# Patient Record
Sex: FEMALE | Race: WHITE | NOT HISPANIC OR LATINO | Employment: OTHER | URBAN - METROPOLITAN AREA
[De-identification: names, ages, dates, MRNs, and addresses within clinical notes are randomized per-mention and may not be internally consistent; named-entity substitution may affect disease eponyms.]

---

## 2024-07-24 ENCOUNTER — APPOINTMENT (OUTPATIENT)
Dept: RADIOLOGY | Facility: HOSPITAL | Age: 74
DRG: 637 | End: 2024-07-24
Payer: MEDICARE

## 2024-07-24 ENCOUNTER — HOSPITAL ENCOUNTER (INPATIENT)
Facility: HOSPITAL | Age: 74
LOS: 1 days | Discharge: NURSING, REHAB OR LONG-TERM CARE | DRG: 637 | End: 2024-07-26
Attending: STUDENT IN AN ORGANIZED HEALTH CARE EDUCATION/TRAINING PROGRAM | Admitting: STUDENT IN AN ORGANIZED HEALTH CARE EDUCATION/TRAINING PROGRAM
Payer: MEDICARE

## 2024-07-24 ENCOUNTER — APPOINTMENT (OUTPATIENT)
Dept: CARDIOLOGY | Facility: HOSPITAL | Age: 74
DRG: 637 | End: 2024-07-24
Payer: MEDICARE

## 2024-07-24 DIAGNOSIS — R65.10 SIRS (SYSTEMIC INFLAMMATORY RESPONSE SYNDROME) (MULTI): ICD-10-CM

## 2024-07-24 DIAGNOSIS — N17.9 AKI (ACUTE KIDNEY INJURY) (CMS-HCC): ICD-10-CM

## 2024-07-24 DIAGNOSIS — Z96.651 STATUS POST TOTAL RIGHT KNEE REPLACEMENT: ICD-10-CM

## 2024-07-24 DIAGNOSIS — R73.9 HYPERGLYCEMIA: Primary | ICD-10-CM

## 2024-07-24 LAB
ANION GAP SERPL CALC-SCNC: 23 MMOL/L (ref 10–20)
BASOPHILS # BLD AUTO: 0.09 X10*3/UL (ref 0–0.1)
BASOPHILS NFR BLD AUTO: 0.6 %
BUN SERPL-MCNC: 36 MG/DL (ref 6–23)
CALCIUM SERPL-MCNC: 10.7 MG/DL (ref 8.6–10.3)
CHLORIDE SERPL-SCNC: 102 MMOL/L (ref 98–107)
CO2 SERPL-SCNC: 14 MMOL/L (ref 21–32)
CREAT SERPL-MCNC: 1.83 MG/DL (ref 0.5–1.05)
EGFRCR SERPLBLD CKD-EPI 2021: 29 ML/MIN/1.73M*2
EOSINOPHIL # BLD AUTO: 0.16 X10*3/UL (ref 0–0.4)
EOSINOPHIL NFR BLD AUTO: 1 %
ERYTHROCYTE [DISTWIDTH] IN BLOOD BY AUTOMATED COUNT: 17 % (ref 11.5–14.5)
GLUCOSE SERPL-MCNC: 368 MG/DL (ref 74–99)
HCT VFR BLD AUTO: 30.8 % (ref 36–46)
HGB BLD-MCNC: 9.6 G/DL (ref 12–16)
IMM GRANULOCYTES # BLD AUTO: 0.19 X10*3/UL (ref 0–0.5)
IMM GRANULOCYTES NFR BLD AUTO: 1.2 % (ref 0–0.9)
LYMPHOCYTES # BLD AUTO: 2.12 X10*3/UL (ref 0.8–3)
LYMPHOCYTES NFR BLD AUTO: 13.7 %
MCH RBC QN AUTO: 27.4 PG (ref 26–34)
MCHC RBC AUTO-ENTMCNC: 31.2 G/DL (ref 32–36)
MCV RBC AUTO: 88 FL (ref 80–100)
MONOCYTES # BLD AUTO: 1.21 X10*3/UL (ref 0.05–0.8)
MONOCYTES NFR BLD AUTO: 7.8 %
NEUTROPHILS # BLD AUTO: 11.68 X10*3/UL (ref 1.6–5.5)
NEUTROPHILS NFR BLD AUTO: 75.7 %
NRBC BLD-RTO: 0 /100 WBCS (ref 0–0)
PLATELET # BLD AUTO: 304 X10*3/UL (ref 150–450)
POTASSIUM SERPL-SCNC: 4.1 MMOL/L (ref 3.5–5.3)
RBC # BLD AUTO: 3.51 X10*6/UL (ref 4–5.2)
SODIUM SERPL-SCNC: 135 MMOL/L (ref 136–145)
WBC # BLD AUTO: 15.5 X10*3/UL (ref 4.4–11.3)

## 2024-07-24 PROCEDURE — 82010 KETONE BODYS QUAN: CPT | Performed by: STUDENT IN AN ORGANIZED HEALTH CARE EDUCATION/TRAINING PROGRAM

## 2024-07-24 PROCEDURE — 71045 X-RAY EXAM CHEST 1 VIEW: CPT

## 2024-07-24 PROCEDURE — 99285 EMERGENCY DEPT VISIT HI MDM: CPT | Mod: 25

## 2024-07-24 PROCEDURE — 85025 COMPLETE CBC W/AUTO DIFF WBC: CPT | Performed by: STUDENT IN AN ORGANIZED HEALTH CARE EDUCATION/TRAINING PROGRAM

## 2024-07-24 PROCEDURE — 80048 BASIC METABOLIC PNL TOTAL CA: CPT | Performed by: STUDENT IN AN ORGANIZED HEALTH CARE EDUCATION/TRAINING PROGRAM

## 2024-07-24 PROCEDURE — 96374 THER/PROPH/DIAG INJ IV PUSH: CPT

## 2024-07-24 PROCEDURE — 71045 X-RAY EXAM CHEST 1 VIEW: CPT | Mod: FOREIGN READ | Performed by: RADIOLOGY

## 2024-07-24 PROCEDURE — 93005 ELECTROCARDIOGRAM TRACING: CPT

## 2024-07-24 PROCEDURE — 96361 HYDRATE IV INFUSION ADD-ON: CPT

## 2024-07-24 PROCEDURE — 36415 COLL VENOUS BLD VENIPUNCTURE: CPT | Performed by: STUDENT IN AN ORGANIZED HEALTH CARE EDUCATION/TRAINING PROGRAM

## 2024-07-24 PROCEDURE — 2500000004 HC RX 250 GENERAL PHARMACY W/ HCPCS (ALT 636 FOR OP/ED): Performed by: STUDENT IN AN ORGANIZED HEALTH CARE EDUCATION/TRAINING PROGRAM

## 2024-07-24 RX ORDER — MORPHINE SULFATE 4 MG/ML
4 INJECTION INTRAVENOUS ONCE
Status: COMPLETED | OUTPATIENT
Start: 2024-07-24 | End: 2024-07-24

## 2024-07-24 ASSESSMENT — PAIN - FUNCTIONAL ASSESSMENT: PAIN_FUNCTIONAL_ASSESSMENT: 0-10

## 2024-07-24 ASSESSMENT — LIFESTYLE VARIABLES
EVER HAD A DRINK FIRST THING IN THE MORNING TO STEADY YOUR NERVES TO GET RID OF A HANGOVER: NO
EVER FELT BAD OR GUILTY ABOUT YOUR DRINKING: NO
HAVE PEOPLE ANNOYED YOU BY CRITICIZING YOUR DRINKING: NO
TOTAL SCORE: 0
HAVE YOU EVER FELT YOU SHOULD CUT DOWN ON YOUR DRINKING: NO

## 2024-07-24 ASSESSMENT — COLUMBIA-SUICIDE SEVERITY RATING SCALE - C-SSRS
1. IN THE PAST MONTH, HAVE YOU WISHED YOU WERE DEAD OR WISHED YOU COULD GO TO SLEEP AND NOT WAKE UP?: NO
2. HAVE YOU ACTUALLY HAD ANY THOUGHTS OF KILLING YOURSELF?: NO
6. HAVE YOU EVER DONE ANYTHING, STARTED TO DO ANYTHING, OR PREPARED TO DO ANYTHING TO END YOUR LIFE?: NO

## 2024-07-24 ASSESSMENT — PAIN SCALES - GENERAL: PAINLEVEL_OUTOF10: 3

## 2024-07-25 PROBLEM — Z96.651 STATUS POST TOTAL RIGHT KNEE REPLACEMENT: Status: ACTIVE | Noted: 2024-01-31

## 2024-07-25 PROBLEM — G47.33 OSA (OBSTRUCTIVE SLEEP APNEA): Status: RESOLVED | Noted: 2020-07-07 | Resolved: 2024-07-25

## 2024-07-25 PROBLEM — M17.11 PRIMARY OSTEOARTHRITIS OF RIGHT KNEE: Status: RESOLVED | Noted: 2022-10-27 | Resolved: 2024-07-25

## 2024-07-25 PROBLEM — R26.2 CANNOT WALK: Status: ACTIVE | Noted: 2024-07-25

## 2024-07-25 PROBLEM — E66.9 OBESITY, CLASS I, BMI 30-34.9: Status: ACTIVE | Noted: 2022-10-27

## 2024-07-25 PROBLEM — D64.9 NORMOCYTIC ANEMIA: Status: ACTIVE | Noted: 2024-07-25

## 2024-07-25 PROBLEM — N18.9 CKD (CHRONIC KIDNEY DISEASE): Status: ACTIVE | Noted: 2024-07-25

## 2024-07-25 PROBLEM — R65.10 SIRS (SYSTEMIC INFLAMMATORY RESPONSE SYNDROME) (MULTI): Status: ACTIVE | Noted: 2024-07-25

## 2024-07-25 PROBLEM — E53.8 VITAMIN B12 DEFICIENCY: Status: RESOLVED | Noted: 2021-04-07 | Resolved: 2024-07-25

## 2024-07-25 PROBLEM — R73.9 HYPERGLYCEMIA: Status: ACTIVE | Noted: 2024-07-25

## 2024-07-25 PROBLEM — R00.0 TACHYCARDIA: Status: ACTIVE | Noted: 2024-07-25

## 2024-07-25 PROBLEM — E87.29 HIGH ANION GAP METABOLIC ACIDOSIS: Status: ACTIVE | Noted: 2024-07-25

## 2024-07-25 PROBLEM — D72.829 LEUKOCYTOSIS: Status: ACTIVE | Noted: 2024-07-25

## 2024-07-25 LAB
ANION GAP BLDV CALCULATED.4IONS-SCNC: 14 MMOL/L (ref 10–25)
ANION GAP SERPL CALC-SCNC: 8 MMOL/L (ref 10–20)
APPEARANCE UR: CLEAR
ATRIAL RATE: 109 BPM
B-OH-BUTYR SERPL-SCNC: 1.15 MMOL/L (ref 0.02–0.27)
BASE EXCESS BLDV CALC-SCNC: -4.9 MMOL/L (ref -2–3)
BILIRUB UR STRIP.AUTO-MCNC: NEGATIVE MG/DL
BODY TEMPERATURE: 37 DEGREES CELSIUS
BUN SERPL-MCNC: 34 MG/DL (ref 6–23)
CA-I BLDV-SCNC: 1.46 MMOL/L (ref 1.1–1.33)
CALCIUM SERPL-MCNC: 9.7 MG/DL (ref 8.6–10.3)
CHLORIDE BLDV-SCNC: 103 MMOL/L (ref 98–107)
CHLORIDE SERPL-SCNC: 105 MMOL/L (ref 98–107)
CO2 SERPL-SCNC: 24 MMOL/L (ref 21–32)
COLOR UR: ABNORMAL
CREAT SERPL-MCNC: 1.53 MG/DL (ref 0.5–1.05)
EGFRCR SERPLBLD CKD-EPI 2021: 35 ML/MIN/1.73M*2
ERYTHROCYTE [DISTWIDTH] IN BLOOD BY AUTOMATED COUNT: 16.8 % (ref 11.5–14.5)
GLUCOSE BLD MANUAL STRIP-MCNC: 234 MG/DL (ref 74–99)
GLUCOSE BLD MANUAL STRIP-MCNC: 252 MG/DL (ref 74–99)
GLUCOSE BLD MANUAL STRIP-MCNC: 292 MG/DL (ref 74–99)
GLUCOSE BLD MANUAL STRIP-MCNC: 336 MG/DL (ref 74–99)
GLUCOSE BLD MANUAL STRIP-MCNC: 389 MG/DL (ref 74–99)
GLUCOSE BLDV-MCNC: 422 MG/DL (ref 74–99)
GLUCOSE SERPL-MCNC: 320 MG/DL (ref 74–99)
GLUCOSE UR STRIP.AUTO-MCNC: ABNORMAL MG/DL
HCO3 BLDV-SCNC: 20.6 MMOL/L (ref 22–26)
HCT VFR BLD AUTO: 25.7 % (ref 36–46)
HCT VFR BLD EST: 29 % (ref 36–46)
HGB BLD-MCNC: 8.4 G/DL (ref 12–16)
HGB BLDV-MCNC: 9.7 G/DL (ref 12–16)
HOLD SPECIMEN: NORMAL
HYALINE CASTS #/AREA URNS AUTO: ABNORMAL /LPF
INHALED O2 CONCENTRATION: 21 %
KETONES UR STRIP.AUTO-MCNC: ABNORMAL MG/DL
LACTATE BLDV-SCNC: 3.4 MMOL/L (ref 0.4–2)
LEUKOCYTE ESTERASE UR QL STRIP.AUTO: NEGATIVE
MCH RBC QN AUTO: 27.7 PG (ref 26–34)
MCHC RBC AUTO-ENTMCNC: 32.7 G/DL (ref 32–36)
MCV RBC AUTO: 85 FL (ref 80–100)
MUCOUS THREADS #/AREA URNS AUTO: ABNORMAL /LPF
NITRITE UR QL STRIP.AUTO: NEGATIVE
NRBC BLD-RTO: 0 /100 WBCS (ref 0–0)
OXYHGB MFR BLDV: 42.2 % (ref 45–75)
P AXIS: 48 DEGREES
PCO2 BLDV: 39 MM HG (ref 41–51)
PH BLDV: 7.33 PH (ref 7.33–7.43)
PH UR STRIP.AUTO: 5 [PH]
PLATELET # BLD AUTO: 246 X10*3/UL (ref 150–450)
PO2 BLDV: 28 MM HG (ref 35–45)
POTASSIUM BLDV-SCNC: 4.4 MMOL/L (ref 3.5–5.3)
POTASSIUM SERPL-SCNC: 3.8 MMOL/L (ref 3.5–5.3)
PR INTERVAL: 134 MS
PROT UR STRIP.AUTO-MCNC: NEGATIVE MG/DL
Q ONSET: 251 MS
QRS COUNT: 18 BEATS
QRS DURATION: 117 MS
QT INTERVAL: 336 MS
QTC CALCULATION(BAZETT): 455 MS
QTC FREDERICIA: 411 MS
R AXIS: -51 DEGREES
RBC # BLD AUTO: 3.03 X10*6/UL (ref 4–5.2)
RBC # UR STRIP.AUTO: ABNORMAL /UL
RBC #/AREA URNS AUTO: ABNORMAL /HPF
SAO2 % BLDV: 43 % (ref 45–75)
SODIUM BLDV-SCNC: 133 MMOL/L (ref 136–145)
SODIUM SERPL-SCNC: 133 MMOL/L (ref 136–145)
SP GR UR STRIP.AUTO: 1.02
SQUAMOUS #/AREA URNS AUTO: ABNORMAL /HPF
T AXIS: 110 DEGREES
T OFFSET: 419 MS
UROBILINOGEN UR STRIP.AUTO-MCNC: NORMAL MG/DL
VENTRICULAR RATE: 110 BPM
WBC # BLD AUTO: 10.8 X10*3/UL (ref 4.4–11.3)
WBC #/AREA URNS AUTO: ABNORMAL /HPF

## 2024-07-25 PROCEDURE — 97110 THERAPEUTIC EXERCISES: CPT | Mod: GP | Performed by: PHYSICAL THERAPIST

## 2024-07-25 PROCEDURE — 84132 ASSAY OF SERUM POTASSIUM: CPT | Performed by: STUDENT IN AN ORGANIZED HEALTH CARE EDUCATION/TRAINING PROGRAM

## 2024-07-25 PROCEDURE — 96361 HYDRATE IV INFUSION ADD-ON: CPT

## 2024-07-25 PROCEDURE — G0378 HOSPITAL OBSERVATION PER HR: HCPCS

## 2024-07-25 PROCEDURE — 82947 ASSAY GLUCOSE BLOOD QUANT: CPT

## 2024-07-25 PROCEDURE — 2500000004 HC RX 250 GENERAL PHARMACY W/ HCPCS (ALT 636 FOR OP/ED): Performed by: STUDENT IN AN ORGANIZED HEALTH CARE EDUCATION/TRAINING PROGRAM

## 2024-07-25 PROCEDURE — 99223 1ST HOSP IP/OBS HIGH 75: CPT | Performed by: STUDENT IN AN ORGANIZED HEALTH CARE EDUCATION/TRAINING PROGRAM

## 2024-07-25 PROCEDURE — 36415 COLL VENOUS BLD VENIPUNCTURE: CPT | Performed by: STUDENT IN AN ORGANIZED HEALTH CARE EDUCATION/TRAINING PROGRAM

## 2024-07-25 PROCEDURE — 85027 COMPLETE CBC AUTOMATED: CPT | Performed by: STUDENT IN AN ORGANIZED HEALTH CARE EDUCATION/TRAINING PROGRAM

## 2024-07-25 PROCEDURE — 99233 SBSQ HOSP IP/OBS HIGH 50: CPT | Performed by: INTERNAL MEDICINE

## 2024-07-25 PROCEDURE — 96365 THER/PROPH/DIAG IV INF INIT: CPT | Mod: 59

## 2024-07-25 PROCEDURE — 81001 URINALYSIS AUTO W/SCOPE: CPT | Performed by: STUDENT IN AN ORGANIZED HEALTH CARE EDUCATION/TRAINING PROGRAM

## 2024-07-25 PROCEDURE — 2500000002 HC RX 250 W HCPCS SELF ADMINISTERED DRUGS (ALT 637 FOR MEDICARE OP, ALT 636 FOR OP/ED): Performed by: STUDENT IN AN ORGANIZED HEALTH CARE EDUCATION/TRAINING PROGRAM

## 2024-07-25 PROCEDURE — 2060000001 HC INTERMEDIATE ICU ROOM DAILY

## 2024-07-25 PROCEDURE — 97162 PT EVAL MOD COMPLEX 30 MIN: CPT | Mod: GP | Performed by: PHYSICAL THERAPIST

## 2024-07-25 PROCEDURE — 97116 GAIT TRAINING THERAPY: CPT | Mod: GP | Performed by: PHYSICAL THERAPIST

## 2024-07-25 PROCEDURE — 2500000001 HC RX 250 WO HCPCS SELF ADMINISTERED DRUGS (ALT 637 FOR MEDICARE OP): Performed by: STUDENT IN AN ORGANIZED HEALTH CARE EDUCATION/TRAINING PROGRAM

## 2024-07-25 PROCEDURE — 87040 BLOOD CULTURE FOR BACTERIA: CPT | Mod: PORLAB | Performed by: STUDENT IN AN ORGANIZED HEALTH CARE EDUCATION/TRAINING PROGRAM

## 2024-07-25 RX ORDER — AMLODIPINE BESYLATE 10 MG/1
1 TABLET ORAL DAILY
COMMUNITY
End: 2024-07-26 | Stop reason: HOSPADM

## 2024-07-25 RX ORDER — LISINOPRIL 20 MG/1
1 TABLET ORAL DAILY
COMMUNITY
End: 2024-07-26 | Stop reason: HOSPADM

## 2024-07-25 RX ORDER — ATORVASTATIN CALCIUM 80 MG/1
0.5 TABLET, FILM COATED ORAL DAILY
COMMUNITY

## 2024-07-25 RX ORDER — MAGNESIUM 250 MG
1 TABLET ORAL DAILY
COMMUNITY

## 2024-07-25 RX ORDER — GLIPIZIDE 5 MG/1
2 TABLET, FILM COATED, EXTENDED RELEASE ORAL
COMMUNITY

## 2024-07-25 RX ORDER — CALCIUM CARBONATE/VITAMIN D3 600MG-5MCG
1 TABLET ORAL DAILY
COMMUNITY

## 2024-07-25 RX ORDER — ACETAMINOPHEN 500 MG
2 TABLET ORAL EVERY 4 HOURS PRN
COMMUNITY

## 2024-07-25 RX ORDER — CEFTRIAXONE 1 G/50ML
1 INJECTION, SOLUTION INTRAVENOUS ONCE
Status: COMPLETED | OUTPATIENT
Start: 2024-07-25 | End: 2024-07-25

## 2024-07-25 RX ORDER — INSULIN LISPRO 100 [IU]/ML
0-10 INJECTION, SOLUTION INTRAVENOUS; SUBCUTANEOUS
Status: DISCONTINUED | OUTPATIENT
Start: 2024-07-25 | End: 2024-07-26 | Stop reason: HOSPADM

## 2024-07-25 RX ORDER — METFORMIN HYDROCHLORIDE 500 MG/1
2 TABLET, EXTENDED RELEASE ORAL 2 TIMES DAILY
COMMUNITY

## 2024-07-25 RX ORDER — SODIUM CHLORIDE 9 MG/ML
100 INJECTION, SOLUTION INTRAVENOUS CONTINUOUS
Status: ACTIVE | OUTPATIENT
Start: 2024-07-25 | End: 2024-07-25

## 2024-07-25 RX ORDER — DEXTROSE 50 % IN WATER (D50W) INTRAVENOUS SYRINGE
12.5
Status: DISCONTINUED | OUTPATIENT
Start: 2024-07-25 | End: 2024-07-26 | Stop reason: HOSPADM

## 2024-07-25 RX ORDER — OXYCODONE HYDROCHLORIDE 5 MG/1
1 CAPSULE ORAL EVERY 4 HOURS PRN
COMMUNITY
End: 2024-07-26 | Stop reason: HOSPADM

## 2024-07-25 RX ORDER — GUAIFENESIN 600 MG/1
600 TABLET, EXTENDED RELEASE ORAL EVERY 12 HOURS PRN
Status: DISCONTINUED | OUTPATIENT
Start: 2024-07-25 | End: 2024-07-26 | Stop reason: HOSPADM

## 2024-07-25 RX ORDER — PANTOPRAZOLE SODIUM 40 MG/10ML
40 INJECTION, POWDER, LYOPHILIZED, FOR SOLUTION INTRAVENOUS DAILY
Status: DISCONTINUED | OUTPATIENT
Start: 2024-07-25 | End: 2024-07-26 | Stop reason: HOSPADM

## 2024-07-25 RX ORDER — ONDANSETRON HYDROCHLORIDE 2 MG/ML
4 INJECTION, SOLUTION INTRAVENOUS EVERY 8 HOURS PRN
Status: DISCONTINUED | OUTPATIENT
Start: 2024-07-25 | End: 2024-07-26 | Stop reason: HOSPADM

## 2024-07-25 RX ORDER — ACETAMINOPHEN, DIPHENHYDRAMINE HCL, PHENYLEPHRINE HCL 325; 25; 5 MG/1; MG/1; MG/1
1 TABLET ORAL NIGHTLY PRN
COMMUNITY

## 2024-07-25 RX ORDER — DEXTROSE 50 % IN WATER (D50W) INTRAVENOUS SYRINGE
25
Status: DISCONTINUED | OUTPATIENT
Start: 2024-07-25 | End: 2024-07-26 | Stop reason: HOSPADM

## 2024-07-25 RX ORDER — LISINOPRIL AND HYDROCHLOROTHIAZIDE 20; 25 MG/1; MG/1
1 TABLET ORAL DAILY
COMMUNITY

## 2024-07-25 RX ORDER — LATANOPROST 50 UG/ML
1 SOLUTION/ DROPS OPHTHALMIC NIGHTLY
COMMUNITY

## 2024-07-25 RX ORDER — PANTOPRAZOLE SODIUM 40 MG/1
40 TABLET, DELAYED RELEASE ORAL DAILY
Status: DISCONTINUED | OUTPATIENT
Start: 2024-07-25 | End: 2024-07-26 | Stop reason: HOSPADM

## 2024-07-25 RX ORDER — ASCORBIC ACID 500 MG
1 TABLET ORAL 2 TIMES DAILY
COMMUNITY

## 2024-07-25 RX ORDER — DOCUSATE SODIUM 100 MG/1
100 CAPSULE, LIQUID FILLED ORAL 2 TIMES DAILY
COMMUNITY

## 2024-07-25 RX ORDER — SPIRONOLACTONE 25 MG/1
1 TABLET ORAL DAILY
COMMUNITY

## 2024-07-25 RX ORDER — BISACODYL 5 MG
10 TABLET, DELAYED RELEASE (ENTERIC COATED) ORAL DAILY PRN
Status: DISCONTINUED | OUTPATIENT
Start: 2024-07-25 | End: 2024-07-26 | Stop reason: HOSPADM

## 2024-07-25 RX ORDER — ONDANSETRON 4 MG/1
4 TABLET, FILM COATED ORAL EVERY 8 HOURS PRN
Status: DISCONTINUED | OUTPATIENT
Start: 2024-07-25 | End: 2024-07-26 | Stop reason: HOSPADM

## 2024-07-25 RX ORDER — TALC
3 POWDER (GRAM) TOPICAL NIGHTLY PRN
Status: DISCONTINUED | OUTPATIENT
Start: 2024-07-25 | End: 2024-07-26 | Stop reason: HOSPADM

## 2024-07-25 RX ORDER — LANOLIN ALCOHOL/MO/W.PET/CERES
1 CREAM (GRAM) TOPICAL DAILY
COMMUNITY

## 2024-07-25 RX ORDER — ENOXAPARIN SODIUM 100 MG/ML
30 INJECTION SUBCUTANEOUS EVERY 24 HOURS
Status: DISCONTINUED | OUTPATIENT
Start: 2024-07-25 | End: 2024-07-26 | Stop reason: HOSPADM

## 2024-07-25 RX ORDER — LIRAGLUTIDE 6 MG/ML
1.8 INJECTION SUBCUTANEOUS DAILY
COMMUNITY

## 2024-07-25 SDOH — SOCIAL STABILITY: SOCIAL INSECURITY: HAVE YOU HAD THOUGHTS OF HARMING ANYONE ELSE?: NO

## 2024-07-25 SDOH — SOCIAL STABILITY: SOCIAL INSECURITY: DOES ANYONE TRY TO KEEP YOU FROM HAVING/CONTACTING OTHER FRIENDS OR DOING THINGS OUTSIDE YOUR HOME?: NO

## 2024-07-25 SDOH — SOCIAL STABILITY: SOCIAL INSECURITY: HAVE YOU HAD ANY THOUGHTS OF HARMING ANYONE ELSE?: NO

## 2024-07-25 SDOH — SOCIAL STABILITY: SOCIAL INSECURITY: ABUSE: ADULT

## 2024-07-25 SDOH — SOCIAL STABILITY: SOCIAL INSECURITY: HAS ANYONE EVER THREATENED TO HURT YOUR FAMILY OR YOUR PETS?: NO

## 2024-07-25 SDOH — SOCIAL STABILITY: SOCIAL INSECURITY: ARE THERE ANY APPARENT SIGNS OF INJURIES/BEHAVIORS THAT COULD BE RELATED TO ABUSE/NEGLECT?: NO

## 2024-07-25 SDOH — SOCIAL STABILITY: SOCIAL INSECURITY: DO YOU FEEL ANYONE HAS EXPLOITED OR TAKEN ADVANTAGE OF YOU FINANCIALLY OR OF YOUR PERSONAL PROPERTY?: NO

## 2024-07-25 SDOH — SOCIAL STABILITY: SOCIAL INSECURITY: ARE YOU OR HAVE YOU BEEN THREATENED OR ABUSED PHYSICALLY, EMOTIONALLY, OR SEXUALLY BY ANYONE?: NO

## 2024-07-25 SDOH — SOCIAL STABILITY: SOCIAL INSECURITY: DO YOU FEEL UNSAFE GOING BACK TO THE PLACE WHERE YOU ARE LIVING?: NO

## 2024-07-25 ASSESSMENT — ENCOUNTER SYMPTOMS
NUMBNESS: 0
CHILLS: 0
AGITATION: 0
SHORTNESS OF BREATH: 0
HEADACHES: 0
DYSURIA: 0
SORE THROAT: 0
LIGHT-HEADEDNESS: 0
BACK PAIN: 0
NERVOUS/ANXIOUS: 0
MYALGIAS: 0
COUGH: 0
DIZZINESS: 0
ABDOMINAL PAIN: 0
WEAKNESS: 0
WHEEZING: 0
PALPITATIONS: 0
CHEST TIGHTNESS: 0
DECREASED CONCENTRATION: 0
WOUND: 0
FREQUENCY: 0
FATIGUE: 0
NAUSEA: 0
STRIDOR: 0
FLANK PAIN: 0
RHINORRHEA: 0
ARTHRALGIAS: 1
JOINT SWELLING: 0
DIARRHEA: 0
DIFFICULTY URINATING: 0
DIAPHORESIS: 0
ABDOMINAL DISTENTION: 0
APNEA: 0
APPETITE CHANGE: 0
HEMATURIA: 0
VOMITING: 0
SINUS PAIN: 0
FEVER: 0
ACTIVITY CHANGE: 0
COLOR CHANGE: 0
CONFUSION: 0
CONSTIPATION: 0

## 2024-07-25 ASSESSMENT — COGNITIVE AND FUNCTIONAL STATUS - GENERAL
TOILETING: A LOT
TURNING FROM BACK TO SIDE WHILE IN FLAT BAD: A LOT
MOVING TO AND FROM BED TO CHAIR: A LOT
CLIMB 3 TO 5 STEPS WITH RAILING: TOTAL
MOVING TO AND FROM BED TO CHAIR: A LOT
TURNING FROM BACK TO SIDE WHILE IN FLAT BAD: A LOT
PATIENT BASELINE BEDBOUND: NO
WALKING IN HOSPITAL ROOM: A LITTLE
WALKING IN HOSPITAL ROOM: A LOT
HELP NEEDED FOR BATHING: A LITTLE
STANDING UP FROM CHAIR USING ARMS: A LOT
DRESSING REGULAR LOWER BODY CLOTHING: A LOT
PERSONAL GROOMING: A LITTLE
MOBILITY SCORE: 11
STANDING UP FROM CHAIR USING ARMS: A LOT
DAILY ACTIVITIY SCORE: 17
MOVING FROM LYING ON BACK TO SITTING ON SIDE OF FLAT BED WITH BEDRAILS: A LOT
CLIMB 3 TO 5 STEPS WITH RAILING: TOTAL
MOVING FROM LYING ON BACK TO SITTING ON SIDE OF FLAT BED WITH BEDRAILS: A LOT
MOBILITY SCORE: 12
DRESSING REGULAR UPPER BODY CLOTHING: A LITTLE

## 2024-07-25 ASSESSMENT — ACTIVITIES OF DAILY LIVING (ADL)
GROOMING: NEEDS ASSISTANCE
BATHING: NEEDS ASSISTANCE
ADEQUATE_TO_COMPLETE_ADL: YES
HEARING - RIGHT EAR: FUNCTIONAL
FEEDING YOURSELF: INDEPENDENT
PATIENT'S MEMORY ADEQUATE TO SAFELY COMPLETE DAILY ACTIVITIES?: YES
HEARING - LEFT EAR: FUNCTIONAL
JUDGMENT_ADEQUATE_SAFELY_COMPLETE_DAILY_ACTIVITIES: YES
LACK_OF_TRANSPORTATION: NO
WALKS IN HOME: NEEDS ASSISTANCE
ASSISTIVE_DEVICE: EYEGLASSES;WALKER
DRESSING YOURSELF: NEEDS ASSISTANCE
TOILETING: NEEDS ASSISTANCE

## 2024-07-25 ASSESSMENT — LIFESTYLE VARIABLES
HOW OFTEN DO YOU HAVE 6 OR MORE DRINKS ON ONE OCCASION: LESS THAN MONTHLY
HOW MANY STANDARD DRINKS CONTAINING ALCOHOL DO YOU HAVE ON A TYPICAL DAY: 1 OR 2
HOW OFTEN DO YOU HAVE A DRINK CONTAINING ALCOHOL: MONTHLY OR LESS
AUDIT-C TOTAL SCORE: 2
SKIP TO QUESTIONS 9-10: 0
AUDIT-C TOTAL SCORE: 2

## 2024-07-25 ASSESSMENT — PAIN SCALES - GENERAL
PAINLEVEL_OUTOF10: 0 - NO PAIN
PAINLEVEL_OUTOF10: 5 - MODERATE PAIN
PAINLEVEL_OUTOF10: 2
PAINLEVEL_OUTOF10: 2
PAINLEVEL_OUTOF10: 0 - NO PAIN

## 2024-07-25 ASSESSMENT — PATIENT HEALTH QUESTIONNAIRE - PHQ9
2. FEELING DOWN, DEPRESSED OR HOPELESS: NOT AT ALL
1. LITTLE INTEREST OR PLEASURE IN DOING THINGS: NOT AT ALL
SUM OF ALL RESPONSES TO PHQ9 QUESTIONS 1 & 2: 0

## 2024-07-25 ASSESSMENT — PAIN - FUNCTIONAL ASSESSMENT: PAIN_FUNCTIONAL_ASSESSMENT: 0-10

## 2024-07-25 NOTE — H&P
[facility] - GENERAL MEDICINE HISTORY AND PHYSICAL    History Obtained From: Pt and daughter    History Of Present Illness:  Darleen Singh is a 73 y.o. female with PMHx s/f DM2, HTN, HLD presenting with weakness and ambulation issues. Pt had a R total knee replacement at [facility] yesterday and was discharged presumably after being evaluated by PT/OT. Today she found herself much weaker and almost fell while trying to use the bathroom. She almost fell, but her son held her up No syncope or fall occurred. Pt is having some pain in the R knee, but it is not more severe than it was yesterday. She is not taking her oxycodone that she was prescribed on discharge. No loss of sensation, pallor, or paresthesias in lower extremities. No bowel or urinary incontinence. No lightheadedness, dizziness, or shortness of breath now. She says her sugars fluctuate a lot, but are generally well-controlled. Her A1c earlier this month was 6.6. No issues with DKA/HSS in the past.    ED Course (Summary):   Vitals on presentation: 99.1 F, 109 bpm, 16 rr, 119/55, 96% on RA  Labs: BMP glu 368, Na 135, bicarb 14, anion gap 23, BUN 36, Cr 1.83, Ca 10.7  Beta-hydroxybutyrate 1.15  CBC WBC 15.5, Hg 9.6, Plt 304  VBG pH 7.33, pCO2 39, pO2 28, lactate 3.4, HCO3 20.6  UA 1+ ketones, 1+ blood, 4+ glucose, 1-5 WBCs  Imaging: CXR - No acute cardiopulmonary disease.   XR knee R - Satisfactory postoperative appearance of the right knee.   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    ED Course (From Provider):  ED Course as of 07/25/24 0147 Wed Jul 24, 2024 2332 EKG shows sinus tachycardia.  Right bundle branch block.  Abnormal T waves in lead aVL, V2.  Appears grossly unchanged from previous EKG. [RS]   2349 Labs at [facility] show a creatinine of 1.4 yesterday.  Hemoglobin  was 9.6.  White blood cell count normal. [RS]      ED Course User Index  [RS] Antoni Coulter DO         Diagnoses as of 07/25/24 0147   Hyperglycemia   MAC (acute kidney injury) (CMS-[facility])   SIRS (systemic inflammatory response syndrome) (Multi)     Relevant Results  Results for orders placed or performed during the hospital encounter of 07/24/24 (from the past 24 hour(s))   Basic metabolic panel   Result Value Ref Range    Glucose 368 (H) 74 - 99 mg/dL    Sodium 135 (L) 136 - 145 mmol/L    Potassium 4.1 3.5 - 5.3 mmol/L    Chloride 102 98 - 107 mmol/L    Bicarbonate 14 (L) 21 - 32 mmol/L    Anion Gap 23 (H) 10 - 20 mmol/L    Urea Nitrogen 36 (H) 6 - 23 mg/dL    Creatinine 1.83 (H) 0.50 - 1.05 mg/dL    eGFR 29 (L) >60 mL/min/1.73m*2    Calcium 10.7 (H) 8.6 - 10.3 mg/dL   CBC and Auto Differential   Result Value Ref Range    WBC 15.5 (H) 4.4 - 11.3 x10*3/uL    nRBC 0.0 0.0 - 0.0 /100 WBCs    RBC 3.51 (L) 4.00 - 5.20 x10*6/uL    Hemoglobin 9.6 (L) 12.0 - 16.0 g/dL    Hematocrit 30.8 (L) 36.0 - 46.0 %    MCV 88 80 - 100 fL    MCH 27.4 26.0 - 34.0 pg    MCHC 31.2 (L) 32.0 - 36.0 g/dL    RDW 17.0 (H) 11.5 - 14.5 %    Platelets 304 150 - 450 x10*3/uL    Neutrophils % 75.7 40.0 - 80.0 %    Immature Granulocytes %, Automated 1.2 (H) 0.0 - 0.9 %    Lymphocytes % 13.7 13.0 - 44.0 %    Monocytes % 7.8 2.0 - 10.0 %    Eosinophils % 1.0 0.0 - 6.0 %    Basophils % 0.6 0.0 - 2.0 %    Neutrophils Absolute 11.68 (H) 1.60 - 5.50 x10*3/uL    Immature Granulocytes Absolute, Automated 0.19 0.00 - 0.50 x10*3/uL    Lymphocytes Absolute 2.12 0.80 - 3.00 x10*3/uL    Monocytes Absolute 1.21 (H) 0.05 - 0.80 x10*3/uL    Eosinophils Absolute 0.16 0.00 - 0.40 x10*3/uL    Basophils Absolute 0.09 0.00 - 0.10 x10*3/uL   Beta Hydroxybutyrate   Result Value Ref Range    Beta-Hydroxybutyrate 1.15 (H) 0.02 - 0.27 mmol/L   Blood Gas Venous Full Panel   Result Value Ref Range    POCT pH, Venous 7.33 7.33 - 7.43 pH    POCT pCO2, Venous 39 (L) 41 - 51 mm Hg     POCT pO2, Venous 28 (L) 35 - 45 mm Hg    POCT SO2, Venous 43 (L) 45 - 75 %    POCT Oxy Hemoglobin, Venous 42.2 (L) 45.0 - 75.0 %    POCT Hematocrit Calculated, Venous 29.0 (L) 36.0 - 46.0 %    POCT Sodium, Venous 133 (L) 136 - 145 mmol/L    POCT Potassium, Venous 4.4 3.5 - 5.3 mmol/L    POCT Chloride, Venous 103 98 - 107 mmol/L    POCT Ionized Calicum, Venous 1.46 (H) 1.10 - 1.33 mmol/L    POCT Glucose, Venous 422 (H) 74 - 99 mg/dL    POCT Lactate, Venous 3.4 (H) 0.4 - 2.0 mmol/L    POCT Base Excess, Venous -4.9 (L) -2.0 - 3.0 mmol/L    POCT HCO3 Calculated, Venous 20.6 (L) 22.0 - 26.0 mmol/L    POCT Hemoglobin, Venous 9.7 (L) 12.0 - 16.0 g/dL    POCT Anion Gap, Venous 14.0 10.0 - 25.0 mmol/L    Patient Temperature 37.0 degrees Celsius    FiO2 21 %   Urinalysis with Reflex Culture and Microscopic   Result Value Ref Range    Color, Urine Light-Yellow Light-Yellow, Yellow, Dark-Yellow    Appearance, Urine Clear Clear    Specific Gravity, Urine 1.022 1.005 - 1.035    pH, Urine 5.0 5.0, 5.5, 6.0, 6.5, 7.0, 7.5, 8.0    Protein, Urine NEGATIVE NEGATIVE, 10 (TRACE), 20 (TRACE) mg/dL    Glucose, Urine 1000 (4+) (A) Normal mg/dL    Blood, Urine 0.06 (1+) (A) NEGATIVE    Ketones, Urine 10 (1+) (A) NEGATIVE mg/dL    Bilirubin, Urine NEGATIVE NEGATIVE    Urobilinogen, Urine Normal Normal mg/dL    Nitrite, Urine NEGATIVE NEGATIVE    Leukocyte Esterase, Urine NEGATIVE NEGATIVE   Urinalysis Microscopic   Result Value Ref Range    WBC, Urine 1-5 1-5, NONE /HPF    RBC, Urine 3-5 NONE, 1-2, 3-5 /HPF    Squamous Epithelial Cells, Urine 1-9 (SPARSE) Reference range not established. /HPF    Mucus, Urine FEW Reference range not established. /LPF    Hyaline Casts, Urine 3+ (A) NONE /LPF      XR chest 1 view    Result Date: 7/25/2024  STUDY: Chest Radiograph;  7/24/2024 11:40 PM INDICATION: Generalized weakness. COMPARISON: None Available ACCESSION NUMBER(S): [id number] ORDERING CLINICIAN: AMINA JARQUIN TECHNIQUE:  Frontal chest was  obtained at 23:39 hours. FINDINGS: CARDIOMEDIASTINAL SILHOUETTE: Cardiomediastinal silhouette is normal in size and configuration.  LUNGS: Lungs are clear.  ABDOMEN: No remarkable upper abdominal findings.  BONES: No acute osseous changes.    No acute cardiopulmonary disease. Signed by Srikanth Servin    XR knee right 1-2 views    Result Date: 7/23/2024  * * *Final Report* * * DATE OF EXAM: Jul 23 2024 11:57AM   MMO   [id number]  -  XR KNEE 2V AP/LAT RT  / ACCESSION #  [id number] PROCEDURE REASON: Post Operative Assessment      * * * * Physician Interpretation * * * *  Clinical Information: Post surgery. AP and lateral views of the right knee were obtained. There is a total knee arthroplasty with prosthetic components in satisfactory position. No acute bony abnormalities are visualized. Postsurgical soft tissue air and swelling are noted anteriorly. Impression: Satisfactory postoperative appearance of the right knee. : SUZIE   Transcribe Date/Time: Jul 23 2024  3:31P Dictated by : RAUL CHACKO MD This examination was interpreted and the report reviewed and electronically signed by: RAUL CHACKO MD on Jul 23 2024  3:32PM  EST    Scheduled medications:  enoxaparin, 30 mg, subcutaneous, q24h  insulin lispro, 0-10 Units, subcutaneous, TID  pantoprazole, 40 mg, oral, Daily   Or  pantoprazole, 40 mg, intravenous, Daily  sodium chloride, 500 mL, intravenous, Once      Continuous medications:  sodium chloride 0.9%, 100 mL/hr, Last Rate: 100 mL/hr (07/25/24 0136)      PRN medications:  PRN medications: bisacodyl, dextrose, dextrose, glucagon, glucagon, guaiFENesin, melatonin, ondansetron **OR** ondansetron     Past Medical History  She has a past medical history of Glaucoma (08/28/2010), HLD (hyperlipidemia), HTN (hypertension), EL (obstructive sleep apnea) (07/07/2020), and Vitamin B12 deficiency (04/07/2021).    Surgical History  She has a past surgical history that includes Total knee arthroplasty.     Social  History  She reports that she has never smoked. She has never used smokeless tobacco. She reports that she does not drink alcohol and does not use drugs.    Family History  No family history on file.    Allergies  Patient has no known allergies.    Code Status  DNR and No Intubation     Review of Systems   Constitutional:  Negative for activity change, appetite change, chills, diaphoresis, fatigue and fever.   HENT:  Negative for congestion, ear pain, rhinorrhea, sinus pain and sore throat.    Respiratory:  Negative for apnea, cough, chest tightness, shortness of breath, wheezing and stridor.    Cardiovascular:  Negative for chest pain, palpitations and leg swelling.   Gastrointestinal:  Negative for abdominal distention, abdominal pain, constipation, diarrhea, nausea and vomiting.   Genitourinary:  Negative for difficulty urinating, dysuria, flank pain, frequency, hematuria and urgency.   Musculoskeletal:  Positive for arthralgias. Negative for back pain, gait problem, joint swelling and myalgias.   Skin:  Negative for color change, pallor, rash and wound.   Neurological:  Negative for dizziness, syncope, weakness, light-headedness, numbness and headaches.   Psychiatric/Behavioral:  Negative for agitation, behavioral problems, confusion and decreased concentration. The patient is not nervous/anxious.    All other systems reviewed and are negative.      Last Recorded Vitals  /61 (BP Location: Right arm, Patient Position: Sitting)   Pulse (!) 105   Temp 37.3 °C (99.1 °F)   Resp 19   Wt 81.6 kg (180 lb)   SpO2 94%      Physical Exam:  Vital signs and nursing notes reviewed.   Constitutional: Pleasant and cooperative. Laying in bed in no acute distress. Conversant.   Skin: Warm and dry; no obvious lesions, rashes, pallor, or jaundice. Good turgor.   Eyes: EOMI. Anicteric sclera.   ENT: Mucous membranes moist; no obvious injury or deformity appreciated.   Head and Neck: Normocephalic, atraumatic. ROM  preserved. Trachea midline. No appreciable JVD.   Respiratory: Nonlabored on RA. Lungs clear to auscultation bilaterally without obvious adventitious sounds. Chest rise is equal.  Cardiovascular: RRR. No gross murmur, gallop, or rub. Extremities are warm and well-perfused with good capillary refill (< 3 seconds). No chest wall tenderness.   GI: Abdomen soft, nontender, nondistended. No obvious organomegaly appreciated. Bowel sounds are present and normoactive.  : No CVA tenderness.   MSK: No gross abnormalities appreciated. No limitations to AROM/PROM appreciated., R knee surgical dressing checked, sutures in place, no erythema or discharge  Extremities: No cyanosis, edema, or clubbing evident. Neurovascularly intact.   Neuro: A&Ox3. CN 2-12 grossly intact. Able to respond to questions appropriately and clearly. No acute focal neurologic deficits appreciated.  Psych: Appropriate mood and behavior.    Assessment/Plan   Principal Problem:    Hyperglycemia  Active Problems:    DM2 (diabetes mellitus, type 2) (Multi)    Essential hypertension, benign    Mixed hyperlipidemia    Obesity, Class I, BMI 30-34.9    Status post total right knee replacement    High anion gap metabolic acidosis    Tachycardia    CKD (chronic kidney disease)    Normocytic anemia    Cannot walk    Leukocytosis    SIRS (systemic inflammatory response syndrome) (Multi)    Plan:  Admit to gen med.    Ambulation issues, s/p R knee arthroplasty  PT/OT consulted  Surgical site appears clean with no issues    Hyperglycemia/DKA(?):  Labs suggest mild DKA, but no signs or symptoms on exam/interview.  Humulin 5 units X1 given  SSI + Gentle hydration given, Recheck BMP in AM    MAC vs CKD?  Cr 1.83 on admission, no baseline to compare  Gentle hydration given, recheck in AM.  Pt is unsure whether she has a history of CKD.    Leukocytosis/SIRS criteria:  Rocephin given in the ER.  Will monitor off abx for now given no real symptoms to suggest infection.  Bcx  X2 pending    Continue appropriate home medications once med rec confirmed.    Diet: Carb controlled  DVT Prophylaxis: Lovenox subcutaneous (check med rec once updated)  Code Status: DNR arrest/DNI per pt discussion today.     DO Kymberly Maravilla dictation software was used to dictate this note and thus there may be minor errors in translation/transcription including garbled speech or misspellings. Please contact for clarification if needed.

## 2024-07-25 NOTE — PROGRESS NOTES
Darleen Singh is a 73 y.o. female admitted for Hyperglycemia. Pharmacy reviewed the patient's [id number]-pr-[id number] medications and allergies for accuracy.    The list below reflects the PTA list prior to pharmacy medication history. A summary a changes to the PTA medication list has been listed below. Please review each medication in order reconciliation for additional clarification and justification.    Source of information: T2P    Medications added:  AMLODIPINE 10MG every day   LIPITOR 80MG .5MG every day   GLIPIZIDE XL 5MG TK 2 TS PO AM AND 1 T QPM   LATANOPROST .005% SOLUTION 1 GTT BOTH EYES at bedtime  VICTOZA 18MG/3ML INJECT 1.8MG every day  LISINOPRIL 20MG every day  LISINOPRIL-hydrochlorothiazide 20-25MG every day   METFORMIN  2 TS BID  SPIRONOLACTONE 25MG every day  CALCIUM + VITAMIN D 600-200 every day  VITAMIN B12 every day   every day  MELATONIN 10MG at bedtime PRN  VITAMIN C 500 BID 14 DAYS  DOCUSATE 100 BID   APAP 500 2 TS Q 4 H PRN [id number]  OXYCODONE IR 5MG Q 4 H PRN PAIN X7DAYS            Medications modified:    Medications to be removed:    Medications of concern:      None       MAIDA KEE

## 2024-07-25 NOTE — CARE PLAN
The patient's goals for the shift include      The clinical goals for the shift include Pt will remain free from injuries this shift    Over the shift, the patient did not make progress toward the following goals.

## 2024-07-25 NOTE — PROGRESS NOTES
07/25/24 1008   Discharge Planning   Living Arrangements Alone   Support Systems Children;Family members   Assistance Needed PT OT post op   Type of Residence Private residence   Home or Post Acute Services In home services   Type of Home Care Services Home OT;Home PT;Home nursing visits   Expected Discharge Disposition HH Services   Does the patient need discharge transport arranged? No   Patient Choice   Patient / Family choosing to utilize agency / facility established prior to hospitalization Yes     Met with patient at bedside, introduced self and role as RN TCC. Patient normally lives alone in her own home. She is generally independent in her own care. Manages her own cooking, cleaning, transportation, follows with PCP, has access and manages her own meds, etc. She underwent R knee surgery at [facility] on Tuesday, she stayed overnight, was seen by PT OT prior to DC and was set up with Home Health Care, she assumes is through [facility]. Patient will be staying with her granddaughter in [place] for a few weeks while she recovers from her surgery. She was discharged to granddaughters and felt more weak and unable to get up. She called squad and was sent to ER. Patient with MAC as well. Patient prefers to return to her granddaughters home on DC but understands rehab may be needed if she continues to have difficulty. PT OT is pending. Will follow for evals.     Referral placed to [facility]. They are active with patient and can resume services on DC. Will follow for PT OT evals and patient preference after.

## 2024-07-25 NOTE — PROGRESS NOTES
Physical Therapy    Physical Therapy Evaluation & Treatment    Patient Name: Darleen Singh  MRN: [id number]  Today's Date: 7/25/2024  Start Time: 1420  Stop Time: 1520  Time Calculation (min): 60 min        2002/2002-A    Assessment/Plan   PT Assessment  PT Assessment Results: Decreased strength, Decreased range of motion, Decreased endurance, Impaired balance, Decreased mobility, Decreased cognition, Decreased skin integrity, Pain  Rehab Prognosis: Fair  Barriers to Discharge: altered mentation of unknown cause (need to determine baseline) new onset weakness of uknown cause  Evaluation/Treatment Tolerance: Patient limited by pain, Patient limited by fatigue  Medical Staff Made Aware: Yes  End of Session Communication: Bedside nurse, Physician  Assessment Comment: Pt presents with new onset of weakness and decreased endurance s/p recent R TKA 7/23/24. Pt on this date with altered mentaion, unknown if baseline. Pt with profound RLE weakness against gravity. Pt with generalized weakness, decreased endurance, decreased balance, and impaired strength RLE>LLE. Pt with no noticed UE weakness, changes in speech quality, or face asymmetry. Pt would benefit from skilled PT to address the listed impairments and continue to address new R TKA rehabilitation. Pt would benefit from mod intensity PT upon discharge to continue to address the listed impairments and ensure safe d/c to home environment with family assistance.  End of Session Patient Position: Bed, 3 rail up, Alarm on  IP OR SWING BED PT PLAN  Inpatient or Swing Bed: Inpatient  PT Plan  Treatment/Interventions: Bed mobility, Transfer training, Gait training, Balance training, Strengthening, Endurance training, Range of motion, Therapeutic exercise, Therapeutic activity, Home exercise program  PT Plan: Ongoing PT  PT Frequency: Daily (1-2 x per day)  PT Discharge Recommendations: Moderate intensity level of continued care  PT Recommended Transfer Status: Assist x2  PT -  "OK to Discharge: Yes (upon medical clearance)    All direct patient care supervised by licensed PT during this session          Subjective     General Visit Information:  General  Reason for Referral: impaired mobility; R TKA 7/23/24 at [facility] and had weakness and instability with fall in bathroom upon discharge found to have hyperglycemia  Referred By: Cayden  Past Medical History Relevant to Rehab: DM2, HTN, HLD  Missed Visit: No  Family/Caregiver Present: No  Prior to Session Communication: Bedside nurse  Patient Position Received: Bed, 3 rail up, Alarm on  General Comment: Pt seen in room 2002 with external catheter, agreeable to therapy    Home Living:  Home Living  Type of Home: House  Lives With: Grandchildren (grandaughter and granddaughters  (2 dogs- beagle and pittbull))  Home Adaptive Equipment: Walker rolling or standard, Cane  Home Layout: One level  Home Access: Stairs to enter with rails  Entrance Stairs-Rails: Left  Entrance Stairs-Number of Steps: 5  Bathroom Shower/Tub: Tub/shower unit  Bathroom Toilet:  (BSC and elevated commode for over toilet)  Bathroom Equipment: Grab bars in shower, Shower chair with back  Home Living Comments: Current living environment referring to pts grandaughters home as pt staying with her s/p R TKA planned surgery.    Prior Level of Function:  Prior Function Per Pt/Caregiver Report  Prior Function Comments: Prior to sx pt fully ind for ADLs and IADLs and using cane as \"safety blanket\" and \"carrying it around\", since sx pt requiring assist for ADLs and IADLs and using FWW for mobility.    Precautions:  Precautions  LE Weight Bearing Status: Weight Bearing as Tolerated  Medical Precautions: Fall precautions  Precautions Comment: sudden onset of weakness, pt with impaired mentation (uncertain if baseline), fall risk, hyperglycemia, WBAT s/p R TKA 7/23/24    Vital Signs:     Objective     Pain:  Pain Assessment  Pain Assessment: 0-10  0-10 (Numeric) Pain " Score: 5 - Moderate pain (pain at 2-3 resting in bed, up to 6/7 during ambulation. End of session at 4-5)  Pain Type: Surgical pain  Pain Location: Knee  Pain Orientation: Right  Pain Interventions: Cold applied (RN notified)    Cognition:  Cognition  Overall Cognitive Status: Impaired  Orientation Level: Oriented X4 (initially said month was Feb and with cueing recovered to correct month of July with increased time to complete)  Following Commands: Follows multistep commands with increased time  Cognition Comments: Pt presents with impaired cognition including decreased processing speed, some innapropriate responses to questions which did not related to the question asked, and decreased attention span. Command follow with repetition, slower speech, and increased time to complete. When asked if needing to use the commode, pt responded she had lemonade in a cup at her table. Multiple times throughout with similar interactions to questioning. Uncertain pt baseline as family not present during session to confirm.  Attention: Exceptions to WFL  Problem Solving: Exceptions to WFL  Insight: Mild  Processing Speed: Delayed    General Assessments:  General Observation  General Observation: Pt presents with flat affect and agreeable to therapy. Pt reports recent onset of BLE weakness and decreased endurance resulting in near fall at home after d/c from [facility] s/p R TKA. Pt reports legs buckling at home due to weakness. Pt daughter and granddaughter's  were helping with mobility needs upon return home as granddaughter was sick (vomitting). Pt has not taken pain medications since at hospital, unsure why and pt unable to clearly explain. Pt with impaired mentation throughout, unclear if baseline or new onset. Pt appearing somewhat ill and unwell, uncertain of what is causing profound RLE weakness. Pain may be a possible limiter, though pt not overly forthcoming about pain throughout session, however does make  nonverbal expressions of pain and vocalizations. Pt appears to have low upright activity tolerance, though pain may affect  increased respirations. Pt difficult to read and does not easily describe how she is feeling when asked. Pt educated on use of pain medications after total joint procedures and ice therapy. Per pt report and notes from prior PT, pt was moving around much easier prior to new weakness onset and having more success with RLE strength post sx. Pt wound weeping through bandage slightly, RN notified. Pt reports hitting R knee with near fall at home prior to admit.   Activity Tolerance  Endurance: Tolerates less than 10 min exercise, no significant change in vital signs  Sensation  Light Touch: No apparent deficits  Strength  Strength Comments: Profound weakness to RLE with pt unable to complete antigravity motion of quadriceps (possibly due to pain) and limited contraction during short arc quad exercise and quad set. However pt able to complete functional mobility of standing and 5ft gait with FWW without knee buckle or lock into terminal extension. Uncertain if weakness during exercises is a result of true weakness, cognition deficits/impaired command follow, or pain limitations. Pt unable to answer questions clearly to help with causation investigation. RLE DF/PF 4-/5, hip flexion 2-/5, quadriceps 2-/5; LLE grossly 4-/5  Perception  Inattention/Neglect: Appears intact  Coordination  Movements are Fluid and Coordinated: Yes  Coordination Comment: gross coordination appears intact  Postural Control  Postural Control: Within Functional Limits  Static Sitting Balance  Static Sitting-Balance Support: Feet supported  Static Sitting-Level of Assistance: Distant supervision  Static Sitting-Comment/Number of Minutes: pt able to sit EOB and on commode ~ 5 min with SBA  Static Standing Balance  Static Standing-Balance Support: Bilateral upper extremity supported  Static Standing-Level of Assistance: Minimum  assistance  Static Standing-Comment/Number of Minutes: pt able to stand with FWW use ~ 1 min with CGA-min A    Functional Assessments:     Bed Mobility  Bed Mobility: Yes (supine <>sit with mod A x1 for LE management, scooting, and trunk control. Pt unable to bring RLE across bed requiring 75% assistance for RLE management supine <>sit. Pt requiried 50% trunk support supine > sit.)  Transfers  Transfer: Yes (sit > stand from bed with mod A x1 and FWW; sit <>stand from BS with min A x1 +1 for safety; stand >sit to bed with min A x1 and decreased controlled descent)  Ambulation/Gait Training  Ambulation/Gait Training Performed: Yes (5ft x2 with FWW and min A x1 +1 for safety for walker management and safety; increased time to complete, fwd flexed posture, decreased step length, heel strike, foot clearance, push off RLE. Lack of terminal extension RLE)  Stairs  Stairs: No       Extremity/Trunk Assessments:  RUE   RUE : Within Functional Limits  LUE   LUE: Within Functional Limits  RLE   RLE : Exceptions to WFL  AROM RLE (degrees)  R Knee Flexion 0-130: 75  R Knee Extension 0-130: -8  LLE   LLE : Exceptions to WFL    Treatments:  Therapeutic Exercise  Therapeutic Exercise Performed: Yes (1x10 ankle pumps, heel slides, and quad sets (unable to complete SAQ/LAQ due to weakness))        Bed Mobility  Bed Mobility: Yes (supine <>sit with mod A x1 for LE management, scooting, and trunk control. Pt unable to bring RLE across bed requiring 75% assistance for RLE management supine <>sit. Pt requiried 50% trunk support supine > sit.)  Ambulation/Gait Training  Ambulation/Gait Training Performed: Yes (5ft x2 with FWW and min A x1 +1 for safety for walker management and safety; increased time to complete, fwd flexed posture, decreased step length, heel strike, foot clearance, push off RLE. Lack of terminal extension RLE)  Transfers  Transfer: Yes (sit > stand from bed with mod A x1 and FWW; sit <>stand from BSC with min A x1 +1  for safety; stand >sit to bed with min A x1 and decreased controlled descent)  Stairs  Stairs: No    Outcome Measures:  [facility] Basic Mobility  Turning from your back to your side while in a flat bed without using bedrails: A lot  Moving from lying on your back to sitting on the side of a flat bed without using bedrails: A lot  Moving to and from bed to chair (including a wheelchair): A lot  Standing up from a chair using your arms (e.g. wheelchair or bedside chair): A lot  To walk in hospital room: A little  Climbing 3-5 steps with railing: Total  Basic Mobility - Total Score: 12                            Goals:  Encounter Problems       Encounter Problems (Active)       PT Problem       Functional Mobility       Start:  07/25/24    Expected End:  08/08/24       Pt will be able to complete bed mobility with no greater than min A x1 and stand pivot transfers from bed <>chair/BSC with no greater than CGA with use of FWW in order to decrease assistance needed upon discharge          Gait       Start:  07/25/24    Expected End:  08/08/24       Pt will be able to ambulate 25 ft with FWW and no greater than CGA displaying good gait mechanics (heel strike, push off, foot clearance) with RLE in order to decrease assistance needed upon discharge         Strengthening       Start:  07/25/24    Expected End:  08/08/24       Pt will participate in 20+ reps of BLE strengthening activities to improve strength and endurance  and improve R knee AROM to 85 deg flexion and -3 of extension or better in order to decrease assistance needed upon discharge.              Pain - Adult            Education Documentation  Precautions, taught by REBECCA Diaz at 7/25/2024  4:39 PM.  Learner: Patient  Readiness: Acceptance  Method: Explanation  Response: Verbalizes Understanding    Home Exercise Program, taught by REBECCA Diaz at 7/25/2024  4:39 PM.  Learner: Patient  Readiness: Acceptance  Method: Explanation  Response: Verbalizes  Understanding    Mobility Training, taught by REBECCA Diaz at 7/25/2024  4:39 PM.  Learner: Patient  Readiness: Acceptance  Method: Explanation  Response: Verbalizes Understanding    Education Comments  No comments found.      REBECCA DIAZ

## 2024-07-25 NOTE — ED PROVIDER NOTES
HPI   Chief Complaint   Patient presents with    Extremity Weakness     Right knee replacement yesterday at [facility]. Walked to restroom and felt weak, denies any falls       73-year-old female with past medical history of diabetes, hypertension, hyperlipidemia presents ED with concerns for weakness.  Patient had a right knee replaced yesterday at [facility].  She was able to walk with her walker last night.  However tonight noticed that she is weaker.  She is trying to get up to the bathroom and felt like her knees will buckle underneath her.  No fall.  Otherwise denies any symptoms.  No chest pain or shortness of breath.  No fever cough or cold symptoms.  No abdominal pain no nausea vomiting or diarrhea.  No dysuria hematuria urinary frequency.  Patient did not fill her oxycodone like she was supposed to.              Patient History   No past medical history on file.  No past surgical history on file.  No family history on file.  Social History     Tobacco Use    Smoking status: Not on file    Smokeless tobacco: Not on file   Substance Use Topics    Alcohol use: Not on file    Drug use: Not on file       Physical Exam   ED Triage Vitals [07/24/24 2306]   Temp Heart Rate Respirations BP   -- (!) 109 16 119/55      Pulse Ox Temp src Heart Rate Source Patient Position   96 % -- -- --      BP Location FiO2 (%)     -- --       Physical Exam  Vitals and nursing note reviewed.   Constitutional:       General: She is not in acute distress.     Appearance: She is well-developed.   HENT:      Head: Normocephalic and atraumatic.   Eyes:      Conjunctiva/sclera: Conjunctivae normal.   Cardiovascular:      Rate and Rhythm: Normal rate and regular rhythm.      Heart sounds: No murmur heard.  Pulmonary:      Effort: Pulmonary effort is normal. No respiratory distress.      Breath sounds: Normal breath sounds.   Abdominal:      Palpations: Abdomen is soft.      Tenderness: There is no abdominal  tenderness.   Musculoskeletal:         General: No swelling.      Cervical back: Neck supple.      Comments: There is a surgical scar to the anterior right knee.  Is some small amount of dried blood to the anterior knee.  Incision otherwise intact.   Skin:     General: Skin is warm and dry.      Capillary Refill: Capillary refill takes less than 2 seconds.   Neurological:      Mental Status: She is alert.   Psychiatric:         Mood and Affect: Mood normal.           ED Course & MDM   ED Course as of 07/25/24 0108 Wed Jul 24, 2024 2332 EKG shows sinus tachycardia.  Right bundle branch block.  Abnormal T waves in lead aVL, V2.  Appears grossly unchanged from previous EKG. [RS]   2349 Labs at [facility] show a creatinine of 1.4 yesterday.  Hemoglobin was 9.6.  White blood cell count normal. [RS]      ED Course User Index  [RS] Antoni Coulter DO         Diagnoses as of 07/25/24 0108   Hyperglycemia   MAC (acute kidney injury) (CMS-HCC)   SIRS (systemic inflammatory response syndrome) (Multi)                       Herbert Coma Scale Score: 15                        Medical Decision Making  HISTORIAN:  Patient    CHART REVIEW:  Patient had a right-sided knee replacement done on July 23 at [facility].    PT SUMMARY:  73-year-old female presents ED with generalized weakness.  On arrival she is slightly tachycardic otherwise stable.    DDX:  UTI, pneumonia, electro abnormality, MAC, dehydration    PLAN:  Obtain CBC, BMP, EKG, UA, VBG, beta hydroxybutyrate, chest x-ray    DISPO/RE-EVAL:  EKG showed sinus tachycardia with no new ischemic changes.  BMP shows an MAC with her creatinine being 1.8 which is up from 1.4 earlier today.  CBC shows a leukocytosis of 15,000.  VBG showed a normal pH and slightly low bicarb at 20.  Beta hydroxybutyrate was only 1.1.  Low suspicion for DKA, will give the patient dose of subcutaneous insulin.  Chest x-ray clear.  Will start the patient on IV fluids along with IV Rocephin  for empiric SIRS criteria.  Will admit patient to the medical team for further evaluation and management of MAC along with his positive SIRS and generalized weakness.          Procedure  Procedures     Antoni Coulter DO  07/25/24 0111

## 2024-07-25 NOTE — CARE PLAN
The patient's goals for the shift include      The clinical goals for the shift include Pt will remain free from injuries this shift    Over the shift, the patient did   Problem: Pain - Adult  Goal: Verbalizes/displays adequate comfort level or baseline comfort level  Outcome: Progressing     Problem: Safety - Adult  Goal: Free from fall injury  Outcome: Progressing     Problem: Discharge Planning  Goal: Discharge to home or other facility with appropriate resources  Outcome: Progressing     Problem: Chronic Conditions and Co-morbidities  Goal: Patient's chronic conditions and co-morbidity symptoms are monitored and maintained or improved  Outcome: Progressing     Problem: Diabetes  Goal: Achieve decreasing blood glucose levels by end of shift  Outcome: Progressing  Goal: Increase stability of blood glucose readings by end of shift  Outcome: Progressing  Goal: Decrease in ketones present in urine by end of shift  Outcome: Progressing  Goal: Maintain electrolyte levels within acceptable range throughout shift  Outcome: Progressing  Goal: Maintain glucose levels >70mg/dl to <250mg/dl throughout shift  Outcome: Progressing  Goal: No changes in neurological exam by end of shift  Outcome: Progressing  Goal: Learn about and adhere to nutrition recommendations by end of shift  Outcome: Progressing  Goal: Vital signs within normal range for age by end of shift  Outcome: Progressing  Goal: Increase self care and/or family involovement by end of shift  Outcome: Progressing  Goal: Receive DSME education by end of shift  Outcome: Progressing   make progress toward the following goals.

## 2024-07-25 NOTE — PROGRESS NOTES
Social work consult placed for positive medical risk screen. SW reviewed pt's chart and communicated with TCC. No SW needs foreseen at this time. SW signing off; available upon request.    Sal Carrillo, MSW, LSW ([id number])   Care Transitions

## 2024-07-25 NOTE — PROGRESS NOTES
Darleen Singh is a 73 y.o. female on day 0 of admission presenting with Hyperglycemia.      Subjective   Darleen Singh is a 73 y.o. female with PMHx s/f DM2, HTN, HLD presenting with weakness and ambulation issues. Pt had a R total knee replacement at [facility] yesterday and was discharged presumably after being evaluated by PT/OT. Today she found herself much weaker and almost fell while trying to use the bathroom. She almost fell, but her son held her up No syncope or fall occurred. Pt is having some pain in the R knee, but it is not more severe than it was yesterday. She is not taking her oxycodone that she was prescribed on discharge. No loss of sensation, pallor, or paresthesias in lower extremities. No bowel or urinary incontinence. No lightheadedness, dizziness, or shortness of breath now. She says her sugars fluctuate a lot, but are generally well-controlled. Her A1c earlier this month was 6.6. No issues with DKA/HSS in the past.     ED Course (Summary):   Vitals on presentation: 99.1 F, 109 bpm, 16 rr, 119/55, 96% on RA  Labs: BMP glu 368, Na 135, bicarb 14, anion gap 23, BUN 36, Cr 1.83, Ca 10.7  Beta-hydroxybutyrate 1.15  CBC WBC 15.5, Hg 9.6, Plt 304  VBG pH 7.33, pCO2 39, pO2 28, lactate 3.4, HCO3 20.6  UA 1+ ketones, 1+ blood, 4+ glucose, 1-5 WBCs  Imaging: CXR - No acute cardiopulmonary disease.   XR knee R - Satisfactory postoperative appearance of the right knee.   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    07/25: Patient was evaluated this morning, has not moved out of bed yet.  No fever or chills, no nausea vomiting.  Blood sugar and is renal function gradually improving       Objective     Last Recorded Vitals  /74 (BP Location: Right arm, Patient Position: Lying)   Pulse 90   Temp 37.3 °C (99.1 °F) (Temporal)   Resp 18   Wt 85.6  kg (188 lb 11.4 oz)   SpO2 95%   Intake/Output last 3 Shifts:    Intake/Output Summary (Last 24 hours) at 7/25/2024 1224  Last data filed at 7/25/2024 1108  Gross per 24 hour   Intake 1503.33 ml   Output 400 ml   Net 1103.33 ml       Admission Weight  Weight: 81.6 kg (180 lb) (07/24/24 2306)    Daily Weight  07/25/24 : 85.6 kg (188 lb 11.4 oz)    Image Results  ECG 12 lead  Sinus tachycardia  Atrial premature complex  Probable left atrial enlargement  RBBB and LAFB  Abnormal T, consider ischemia, lateral leads    See ED provider note for full interpretation and clinical correlation  Confirmed by Darleen Rivera ([id number]) on 7/25/2024 11:03:16 AM  XR chest 1 view  Narrative: STUDY:  Chest Radiograph;  7/24/2024 11:40 PM  INDICATION:  Generalized weakness.  COMPARISON:  None Available  ACCESSION NUMBER(S):  [id number]  ORDERING CLINICIAN:  AMINA JARQUIN  TECHNIQUE:  Frontal chest was obtained at 23:39 hours.  FINDINGS:  CARDIOMEDIASTINAL SILHOUETTE:  Cardiomediastinal silhouette is normal in size and configuration.     LUNGS:  Lungs are clear.     ABDOMEN:  No remarkable upper abdominal findings.     BONES:  No acute osseous changes.  Impression: No acute cardiopulmonary disease.  Signed by Srikanth Servin      Physical Exam  Patient is awake and orient, not in apparent distress  Eyes: PERRLA, no conjunctival congestion  Chest: Bilateral Air entry, no crackles or wheezing  Heart: s1S2 regular, no murmur  Abdomen: Soft, non tender, BS present  Ext: Postop dressing on right knee  Relevant Results               Assessment/Plan   This patient currently has cardiac telemetry ordered; if you would like to modify or discontinue the telemetry order, click here to go to the orders activity to modify/discontinue the order.      DKA  Labs suggest mild DKA, but no signs or symptoms on exam/interview.  Humulin 5 units X1 given  Anion gap closed and bicarb level improved  Patient is currently on subcu basal and bolus insulin with sliding  scale coverage  Continue IV hydration       MAC on CKD stage III  Cr 1.83 on admission, no baseline to compare  Gentle hydration given, recheck in AM.       Leukocytosis/SIRS criteria:  Rocephin given in the ER.  Will monitor off abx for now given no real symptoms to suggest infection.  Follow cultures-blood cultures negative so far    Physical debility/deconditioning  Status post recent right TKA   PT/OT ordered     Diet: Carb controlled  DVT Prophylaxis: Lovenox subcutaneous (check med rec once updated)  Code Status: DNR arrest/DNI per pt discussion today.                  Vitaliy Lopes MD

## 2024-07-26 VITALS
HEART RATE: 57 BPM | HEIGHT: 64 IN | WEIGHT: 193.34 LBS | BODY MASS INDEX: 33.01 KG/M2 | TEMPERATURE: 98 F | SYSTOLIC BLOOD PRESSURE: 108 MMHG | RESPIRATION RATE: 20 BRPM | OXYGEN SATURATION: 99 % | DIASTOLIC BLOOD PRESSURE: 60 MMHG

## 2024-07-26 PROBLEM — R73.9 HYPERGLYCEMIA: Status: RESOLVED | Noted: 2024-07-25 | Resolved: 2024-07-26

## 2024-07-26 PROBLEM — D72.829 LEUKOCYTOSIS: Status: RESOLVED | Noted: 2024-07-25 | Resolved: 2024-07-26

## 2024-07-26 PROBLEM — R00.0 TACHYCARDIA: Status: RESOLVED | Noted: 2024-07-25 | Resolved: 2024-07-26

## 2024-07-26 PROBLEM — E87.29 HIGH ANION GAP METABOLIC ACIDOSIS: Status: RESOLVED | Noted: 2024-07-25 | Resolved: 2024-07-26

## 2024-07-26 PROBLEM — R65.10 SIRS (SYSTEMIC INFLAMMATORY RESPONSE SYNDROME) (MULTI): Status: RESOLVED | Noted: 2024-07-25 | Resolved: 2024-07-26

## 2024-07-26 LAB
GLUCOSE BLD MANUAL STRIP-MCNC: 192 MG/DL (ref 74–99)
GLUCOSE BLD MANUAL STRIP-MCNC: 239 MG/DL (ref 74–99)
GLUCOSE BLD MANUAL STRIP-MCNC: 344 MG/DL (ref 74–99)

## 2024-07-26 PROCEDURE — G0378 HOSPITAL OBSERVATION PER HR: HCPCS

## 2024-07-26 PROCEDURE — 99239 HOSP IP/OBS DSCHRG MGMT >30: CPT | Performed by: INTERNAL MEDICINE

## 2024-07-26 PROCEDURE — 97116 GAIT TRAINING THERAPY: CPT | Mod: CQ,GP

## 2024-07-26 PROCEDURE — 2500000001 HC RX 250 WO HCPCS SELF ADMINISTERED DRUGS (ALT 637 FOR MEDICARE OP): Performed by: STUDENT IN AN ORGANIZED HEALTH CARE EDUCATION/TRAINING PROGRAM

## 2024-07-26 PROCEDURE — 97110 THERAPEUTIC EXERCISES: CPT | Mod: CQ,GP

## 2024-07-26 PROCEDURE — 2500000002 HC RX 250 W HCPCS SELF ADMINISTERED DRUGS (ALT 637 FOR MEDICARE OP, ALT 636 FOR OP/ED): Performed by: INTERNAL MEDICINE

## 2024-07-26 PROCEDURE — 2500000001 HC RX 250 WO HCPCS SELF ADMINISTERED DRUGS (ALT 637 FOR MEDICARE OP): Performed by: INTERNAL MEDICINE

## 2024-07-26 PROCEDURE — 82947 ASSAY GLUCOSE BLOOD QUANT: CPT

## 2024-07-26 PROCEDURE — 2500000004 HC RX 250 GENERAL PHARMACY W/ HCPCS (ALT 636 FOR OP/ED): Performed by: STUDENT IN AN ORGANIZED HEALTH CARE EDUCATION/TRAINING PROGRAM

## 2024-07-26 RX ORDER — ASCORBIC ACID 500 MG
500 TABLET ORAL 2 TIMES DAILY
Status: DISCONTINUED | OUTPATIENT
Start: 2024-07-26 | End: 2024-07-26 | Stop reason: HOSPADM

## 2024-07-26 RX ORDER — METFORMIN HYDROCHLORIDE 500 MG/1
500 TABLET, EXTENDED RELEASE ORAL
Status: DISCONTINUED | OUTPATIENT
Start: 2024-07-26 | End: 2024-07-26 | Stop reason: HOSPADM

## 2024-07-26 RX ORDER — METFORMIN HYDROCHLORIDE 500 MG/1
1000 TABLET, EXTENDED RELEASE ORAL 2 TIMES DAILY
Status: DISCONTINUED | OUTPATIENT
Start: 2024-07-26 | End: 2024-07-26

## 2024-07-26 RX ORDER — DOCUSATE SODIUM 100 MG/1
100 CAPSULE, LIQUID FILLED ORAL 2 TIMES DAILY
Status: DISCONTINUED | OUTPATIENT
Start: 2024-07-26 | End: 2024-07-26 | Stop reason: HOSPADM

## 2024-07-26 RX ORDER — SPIRONOLACTONE 25 MG/1
25 TABLET ORAL DAILY
Status: DISCONTINUED | OUTPATIENT
Start: 2024-07-26 | End: 2024-07-26 | Stop reason: HOSPADM

## 2024-07-26 RX ORDER — OXYCODONE HYDROCHLORIDE 5 MG/1
5 TABLET ORAL EVERY 6 HOURS PRN
Qty: 15 TABLET | Refills: 0 | Status: SHIPPED | OUTPATIENT
Start: 2024-07-26

## 2024-07-26 RX ORDER — ATORVASTATIN CALCIUM 40 MG/1
40 TABLET, FILM COATED ORAL DAILY
Status: DISCONTINUED | OUTPATIENT
Start: 2024-07-26 | End: 2024-07-26 | Stop reason: HOSPADM

## 2024-07-26 RX ORDER — LATANOPROST 50 UG/ML
1 SOLUTION/ DROPS OPHTHALMIC NIGHTLY
Status: DISCONTINUED | OUTPATIENT
Start: 2024-07-26 | End: 2024-07-26 | Stop reason: HOSPADM

## 2024-07-26 RX ORDER — BISACODYL 5 MG
10 TABLET, DELAYED RELEASE (ENTERIC COATED) ORAL DAILY PRN
Qty: 30 TABLET | Refills: 0 | Status: SHIPPED | OUTPATIENT
Start: 2024-07-26

## 2024-07-26 RX ORDER — GLIPIZIDE 2.5 MG/1
5 TABLET, EXTENDED RELEASE ORAL
Status: DISCONTINUED | OUTPATIENT
Start: 2024-07-26 | End: 2024-07-26 | Stop reason: HOSPADM

## 2024-07-26 ASSESSMENT — PAIN SCALES - GENERAL: PAINLEVEL_OUTOF10: 0 - NO PAIN

## 2024-07-26 ASSESSMENT — COGNITIVE AND FUNCTIONAL STATUS - GENERAL
TURNING FROM BACK TO SIDE WHILE IN FLAT BAD: A LITTLE
MOBILITY SCORE: 14
CLIMB 3 TO 5 STEPS WITH RAILING: TOTAL
MOVING FROM LYING ON BACK TO SITTING ON SIDE OF FLAT BED WITH BEDRAILS: A LITTLE
STANDING UP FROM CHAIR USING ARMS: A LOT
MOVING TO AND FROM BED TO CHAIR: A LITTLE
WALKING IN HOSPITAL ROOM: A LOT

## 2024-07-26 NOTE — PROGRESS NOTES
Physical Therapy  Physical Therapy Treatment    Patient Name: Darleen Singh  MRN: [id number]  Today's Date: 7/26/2024  Time Calculation  Start Time: 1100  Stop Time: 1123  Time Calculation (min): 23 min    Assessment/Plan   PT Plan  Treatment/Interventions: Bed mobility, Transfer training, Gait training, Balance training, Strengthening, Endurance training, Range of motion, Therapeutic exercise, Therapeutic activity, Home exercise program  PT Plan: Ongoing PT  PT Frequency: Daily (1-2 x per day)  PT Discharge Recommendations: Moderate intensity level of continued care  PT Recommended Transfer Status: Assist x2  PT - OK to Discharge: Yes (upon medical clearance)    General Visit Information:   PT  Visit  PT Received On: 07/26/24  Response to Previous Treatment: Patient with no complaints from previous session.    Reason for Referral: hyperglycemia  Room: 2002    Subjective   Precautions:  Falls     WBAT RLE    Objective   Pain:  No pain at rest, 5-6 R knee pain with WBing    Cognition:  Oriented X4    Activity Tolerance:  Activity Tolerance  Endurance: Tolerates 10 - 20 min exercise with multiple rests    Treatments:  Therapeutic Exercise  1x10 reps RLE LAQ, hip flexion in sitting, AAROM    Bed Mobility  Supine to sitting: Minimum assistance  Scooting: Minimum assistance    Transfers  Sit to stand: Minimum assistance  Stand to sit: Minimum assistance  Transfer Device: Walker    Ambulation/Gait Training  15 feet x1 rep with FWW, Jerald  Slow trell, step to pattern, antalgic       Pt remained sitting in bedside chair following tx. Alarm on and call light in reach.       Outcome Measures:  [facility] Basic Mobility  Turning from your back to your side while in a flat bed without using bedrails: A little  Moving from lying on your back to sitting on the side of a flat bed without using bedrails: A little  Moving to and from bed to chair (including a wheelchair): A little  Standing up from a chair using your arms (e.g. wheelchair  or bedside chair): A lot  To walk in hospital room: A lot  Climbing 3-5 steps with railing: Total  Basic Mobility - Total Score: 14    Education Documentation  Precautions, taught by Pradeep Salinas PTA at 7/26/2024 12:19 PM.  Learner: Patient  Readiness: Acceptance  Method: Explanation, Demonstration  Response: Needs Reinforcement    Home Exercise Program, taught by Pradeep Salinas PTA at 7/26/2024 12:19 PM.  Learner: Patient  Readiness: Acceptance  Method: Explanation, Demonstration  Response: Needs Reinforcement    Mobility Training, taught by Pradeep Salinas PTA at 7/26/2024 12:19 PM.  Learner: Patient  Readiness: Acceptance  Method: Explanation, Demonstration  Response: Needs Reinforcement    Education Comments  No comments found.        OP EDUCATION:       Encounter Problems       Encounter Problems (Active)       PT Problem       Functional Mobility (Progressing)       Start:  07/25/24    Expected End:  08/08/24       Pt will be able to complete bed mobility with no greater than min A x1 and stand pivot transfers from bed <>chair/BSC with no greater than CGA with use of FWW in order to decrease assistance needed upon discharge          Gait (Progressing)       Start:  07/25/24    Expected End:  08/08/24       Pt will be able to ambulate 25 ft with FWW and no greater than CGA displaying good gait mechanics (heel strike, push off, foot clearance) with RLE in order to decrease assistance needed upon discharge         Strengthening (Progressing)       Start:  07/25/24    Expected End:  08/08/24       Pt will participate in 20+ reps of BLE strengthening activities to improve strength and endurance  and improve R knee AROM to 85 deg flexion and -3 of extension or better in order to decrease assistance needed upon discharge.              Pain - Adult

## 2024-07-26 NOTE — PROGRESS NOTES
Met with patient at bedside to discuss discharge planning. PT worked with patient later in day yesterday 7/25 and recommend SNF placement. [facility] list was printed for patient review. Preference is [facility] [place], referral was sent, pending acceptance. Patient will need auth for admission.     [facility] [place] is able to accept.  Precert Team to start auth at this time as patient is ready to DC.     Auth Approved. Patient to discharge to [facility] [place] today. Patient is aware. CNC to arrange transport and send DC paperwork. Bedside RN given N2N report number.

## 2024-07-26 NOTE — CARE PLAN
The clinical goals for the shift include Pt will be free from falls  Problem: Pain - Adult  Goal: Verbalizes/displays adequate comfort level or baseline comfort level  Outcome: Progressing     Problem: Safety - Adult  Goal: Free from fall injury  Outcome: Progressing     Problem: Discharge Planning  Goal: Discharge to home or other facility with appropriate resources  Outcome: Progressing     Problem: Chronic Conditions and Co-morbidities  Goal: Patient's chronic conditions and co-morbidity symptoms are monitored and maintained or improved  Outcome: Progressing     Problem: Diabetes  Goal: Achieve decreasing blood glucose levels by end of shift  Outcome: Progressing  Goal: Increase stability of blood glucose readings by end of shift  Outcome: Progressing  Goal: Decrease in ketones present in urine by end of shift  Outcome: Progressing  Goal: Maintain electrolyte levels within acceptable range throughout shift  Outcome: Progressing  Goal: Maintain glucose levels >70mg/dl to <250mg/dl throughout shift  Outcome: Progressing  Goal: No changes in neurological exam by end of shift  Outcome: Progressing  Goal: Learn about and adhere to nutrition recommendations by end of shift  Outcome: Progressing  Goal: Vital signs within normal range for age by end of shift  Outcome: Progressing  Goal: Increase self care and/or family involovement by end of shift  Outcome: Progressing  Goal: Receive DSME education by end of shift  Outcome: Progressing     Problem: Fall/Injury  Goal: Not fall by end of shift  Outcome: Progressing  Goal: Be free from injury by end of the shift  Outcome: Progressing  Goal: Verbalize understanding of personal risk factors for fall in the hospital  Outcome: Progressing  Goal: Verbalize understanding of risk factor reduction measures to prevent injury from fall in the home  Outcome: Progressing  Goal: Use assistive devices by end of the shift  Outcome: Progressing  Goal: Pace activities to prevent  fatigue by end of the shift  Outcome: Progressing     Problem: Pain  Goal: Takes deep breaths with improved pain control throughout the shift  Outcome: Progressing  Goal: Turns in bed with improved pain control throughout the shift  Outcome: Progressing  Goal: Walks with improved pain control throughout the shift  Outcome: Progressing  Goal: Performs ADL's with improved pain control throughout shift  Outcome: Progressing  Goal: Participates in PT with improved pain control throughout the shift  Outcome: Progressing  Goal: Free from opioid side effects throughout the shift  Outcome: Progressing  Goal: Free from acute confusion related to pain meds throughout the shift  Outcome: Progressing

## 2024-07-26 NOTE — DOCUMENTATION CLARIFICATION NOTE
"    PATIENT:               BRAN BLAKE  ACCT #:                  [id number]  MRN:                       [id number]  :                       1950  ADMIT DATE:       2024 11:02 PM  DISCH DATE:        2024 4:33 PM  RESPONDING PROVIDER #:        [id number]          PROVIDER RESPONSE TEXT:    Non-infectious SIRS with acute organ dysfunction of MAC and metabolic acidosis    CDI QUERY TEXT:    Clarification    :    Instruction:    Based on your assessment of the patient and the clinical information, please provide the requested documentation by clicking on the appropriate radio button and enter any additional information if prompted.    Question: Please further clarify if there is a diagnosis related to the clinical information    When answering this query, please exercise your independent professional judgment. The fact that a question is being asked, does not imply that any particular answer is desired or expected.    The patient's clinical indicators include:  Clinical Information: Admit with: \" DKA , but no signs of symptoms on exam. MAC on CKD stage III, Leukocytosis/SIRS criteria.\"    Clinical Indicators:  Vital signs on admit:  T 99.1-99.2. Hr 101-109, rr 16-19. Pox 94-96 percent  Labs 1. Labs: BMP glu 368, Na 135, bicarb 14, anion gap 23, BUN 36, Cr 1.83, Ca 10.7  a. Beta-hydroxybutyrate 1.15  b. CBC WBC 15.5, Hg 9.6, Plt 304  c. VBG pH 7.33, pCO2 39, pO2 28, lactate 3.4, HCO3 20.6  d. UA 1+ ketones, 1+ blood, 4+ glucose, 1-5 WBCs  Labs the day before at [facility] s/p right knee replacement: Cr 1.4, Hgb 9.6, Wbc nl.    Treatment: IV  cc fb x 2 , IV Rocephin, SSI    Risk Factors: DM, Recent knee replacement.  Options provided:  -- Non-infectious SIRS with acute organ dysfunction of MAC and metabolic acidosis  -- Non-infectious SIRS without acute organ dysfunction  -- Other - I will add my own diagnosis  -- Refer to Clinical Documentation Reviewer    Query created by: Shireen Kuhn on 2024 7:04 " AM      Electronically signed by:  TIFFANY LENZ MD 7/26/2024 5:22 PM

## 2024-07-26 NOTE — CARE PLAN
The patient's goals for the shift include      The clinical goals for the shift include Pt will be free from falls throughout shift.    Over the shift, the patient did not make progress toward the following goals.

## 2024-07-26 NOTE — PROGRESS NOTES
Occupational Therapy                 Therapy Communication Note    Patient Name: Darleen Singh  MRN: [id number]  Today's Date: 7/26/2024     Discipline: Occupational Therapy - order received and chart reviewed    Missed Visit Reason: Missed Visit Reason: Other (Comment) (Upon arrival to room at 1623 - pt being check out in process of leaving and dc home)    Missed Time: Attempt    Comment:    PHYLLIS Ibrahim, OTR/L

## 2024-07-26 NOTE — NURSING NOTE
Pt assisted to bedside commode and then self disimpacted herself. Glove was offered and pt declined.

## 2024-07-26 NOTE — DISCHARGE SUMMARY
Discharge Diagnosis  DKA  MAC on CKD stage III  Recent total knee arthroplasty  Physical debility/deconditioning      This discharge took greater than 35 minutes.    Test Results Pending At Discharge  Pending Labs       Order Current Status    Blood Culture Preliminary result    Blood Culture Preliminary result            Hospital Course   Darleen Singh is a 73 y.o. female with PMHx s/f DM2, HTN, HLD presenting with weakness and ambulation issues. Pt had a R total knee replacement at [facility] yesterday and was discharged presumably after being evaluated by PT/OT. Today she found herself much weaker and almost fell while trying to use the bathroom. She almost fell, but her son held her up No syncope or fall occurred. Pt is having some pain in the R knee, but it is not more severe than it was yesterday. She is not taking her oxycodone that she was prescribed on discharge. No loss of sensation, pallor, or paresthesias in lower extremities. No bowel or urinary incontinence. No lightheadedness, dizziness, or shortness of breath now. She says her sugars fluctuate a lot, but are generally well-controlled. Her A1c earlier this month was 6.6. No issues with DKA/HSS in the past.     ED Course (Summary):   Vitals on presentation: 99.1 F, 109 bpm, 16 rr, 119/55, 96% on RA  Labs: BMP glu 368, Na 135, bicarb 14, anion gap 23, BUN 36, Cr 1.83, Ca 10.7  Beta-hydroxybutyrate 1.15  CBC WBC 15.5, Hg 9.6, Plt 304  VBG pH 7.33, pCO2 39, pO2 28, lactate 3.4, HCO3 20.6  UA 1+ ketones, 1+ blood, 4+ glucose, 1-5 WBCs  Imaging: CXR - No acute cardiopulmonary disease.   XR knee R - Satisfactory postoperative appearance of the right knee.   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     Blood pressure improved, anion gap closed as well as bicarb level improved with IV fluid and IV insulin.   Renal function gradually improved with treatment.  PT/OT evaluated patient recommended SNF for rehabilitation.  Patient will be discharged to [facility] for rehabilitation.  Follow-up with PCP as well as orthopedic surgery as an outpatient.    Pertinent Physical Exam At Time of Discharge  Physical Exam  Patient is awake and orient, not in apparent distress  Eyes: PERRLA, no conjunctival congestion  Chest: Bilateral Air entry, no crackles or wheezing  Heart: s1S2 regular, no murmur  Abdomen: Soft, non tender, BS present  Ext:    Home Medications     Medication List      START taking these medications     bisacodyl 5 mg EC tablet; Commonly known as: Dulcolax; Take 2 tablets   (10 mg) by mouth once daily as needed for constipation. Do not crush,   chew, or split.   oxyCODONE 5 mg immediate release tablet; Commonly known as: Roxicodone;   Take 1 tablet (5 mg) by mouth every 6 hours if needed for severe pain (7 -   10).; Replaces: oxyCODONE 5 mg immediate release capsule     CONTINUE taking these medications     acetaminophen 500 mg tablet; Commonly known as: Tylenol   ascorbic acid 500 mg tablet; Commonly known as: Vitamin C   atorvastatin 80 mg tablet; Commonly known as: Lipitor   calcium carbonate-vitamin D3 600 mg-5 mcg (200 unit) tablet   cyanocobalamin 1,000 mcg tablet; Commonly known as: Vitamin B-12   docusate sodium 100 mg capsule; Commonly known as: Colace   glipiZIDE XL 5 mg 24 hr tablet; Commonly known as: Glucotrol XL   latanoprost 0.005 % ophthalmic solution; Commonly known as: Xalatan   liraglutide 0.6 mg/0.1 mL (18 mg/3 mL) injection; Commonly known as:   Victoza   lisinopriL-hydrochlorothiazide 20-25 mg tablet   magnesium 250 mg tablet   melatonin 10 mg tablet   metFORMIN  mg 24 hr tablet; Commonly known as: Glucophage-XR   spironolactone 25 mg tablet; Commonly known as: Aldactone     STOP taking these medications     amLODIPine 10 mg tablet; Commonly known as: Norvasc   lisinopril 20 mg tablet    oxyCODONE 5 mg immediate release capsule; Commonly known as: Oxy-IR;   Replaced by: oxyCODONE 5 mg immediate release tablet       Outpatient Follow-Up  No follow-ups on file.     Vitaliy Lopes MD  7/26/2024  12:42 PM

## 2024-07-26 NOTE — NURSING NOTE
Called report to Harini WISE at [facility]. All personal belongings packed and with pt. Home meds with pt and RN at facility to give to daughter who will be meeting pt there. No voiced complaints. No change in assessment.

## 2024-07-28 LAB
BACTERIA BLD CULT: NORMAL
BACTERIA BLD CULT: NORMAL

## 2024-07-29 ENCOUNTER — LAB REQUISITION (OUTPATIENT)
Dept: LAB | Facility: HOSPITAL | Age: 74
End: 2024-07-29

## 2024-07-29 DIAGNOSIS — E11.9 TYPE 2 DIABETES MELLITUS WITHOUT COMPLICATIONS (MULTI): ICD-10-CM

## 2024-07-29 LAB
ALBUMIN SERPL BCP-MCNC: 3.1 G/DL (ref 3.4–5)
ALP SERPL-CCNC: 65 U/L (ref 33–136)
ALT SERPL W P-5'-P-CCNC: 5 U/L (ref 7–45)
ANION GAP SERPL CALC-SCNC: 16 MMOL/L (ref 10–20)
AST SERPL W P-5'-P-CCNC: 7 U/L (ref 9–39)
BACTERIA BLD CULT: NORMAL
BACTERIA BLD CULT: NORMAL
BILIRUB SERPL-MCNC: 0.5 MG/DL (ref 0–1.2)
BUN SERPL-MCNC: 17 MG/DL (ref 6–23)
CALCIUM SERPL-MCNC: 8.5 MG/DL (ref 8.6–10.3)
CHLORIDE SERPL-SCNC: 103 MMOL/L (ref 98–107)
CO2 SERPL-SCNC: 25 MMOL/L (ref 21–32)
CREAT SERPL-MCNC: 0.86 MG/DL (ref 0.5–1.05)
EGFRCR SERPLBLD CKD-EPI 2021: 71 ML/MIN/1.73M*2
ERYTHROCYTE [DISTWIDTH] IN BLOOD BY AUTOMATED COUNT: 16.5 % (ref 11.5–14.5)
GLUCOSE SERPL-MCNC: 146 MG/DL (ref 74–99)
HCT VFR BLD AUTO: 28.4 % (ref 36–46)
HGB BLD-MCNC: 8.8 G/DL (ref 12–16)
MCH RBC QN AUTO: 27.2 PG (ref 26–34)
MCHC RBC AUTO-ENTMCNC: 31 G/DL (ref 32–36)
MCV RBC AUTO: 88 FL (ref 80–100)
NRBC BLD-RTO: 0 /100 WBCS (ref 0–0)
PLATELET # BLD AUTO: 331 X10*3/UL (ref 150–450)
POTASSIUM SERPL-SCNC: 4.2 MMOL/L (ref 3.5–5.3)
PROT SERPL-MCNC: 5.2 G/DL (ref 6.4–8.2)
RBC # BLD AUTO: 3.23 X10*6/UL (ref 4–5.2)
SODIUM SERPL-SCNC: 140 MMOL/L (ref 136–145)
WBC # BLD AUTO: 7.8 X10*3/UL (ref 4.4–11.3)

## 2024-07-29 PROCEDURE — 80053 COMPREHEN METABOLIC PANEL: CPT | Mod: OUT | Performed by: INTERNAL MEDICINE

## 2024-07-29 PROCEDURE — 85027 COMPLETE CBC AUTOMATED: CPT | Mod: OUT | Performed by: INTERNAL MEDICINE

## 2024-08-02 ENCOUNTER — LAB REQUISITION (OUTPATIENT)
Dept: LAB | Facility: HOSPITAL | Age: 74
End: 2024-08-02

## 2024-08-02 DIAGNOSIS — D64.9 ANEMIA, UNSPECIFIED: ICD-10-CM

## 2024-08-02 LAB
ALBUMIN SERPL BCP-MCNC: 3.3 G/DL (ref 3.4–5)
ALP SERPL-CCNC: 67 U/L (ref 33–136)
ALT SERPL W P-5'-P-CCNC: 8 U/L (ref 7–45)
ANION GAP SERPL CALC-SCNC: 16 MMOL/L (ref 10–20)
AST SERPL W P-5'-P-CCNC: 11 U/L (ref 9–39)
BILIRUB SERPL-MCNC: 0.4 MG/DL (ref 0–1.2)
BUN SERPL-MCNC: 26 MG/DL (ref 6–23)
CALCIUM SERPL-MCNC: 8.8 MG/DL (ref 8.6–10.3)
CHLORIDE SERPL-SCNC: 100 MMOL/L (ref 98–107)
CO2 SERPL-SCNC: 26 MMOL/L (ref 21–32)
CREAT SERPL-MCNC: 0.95 MG/DL (ref 0.5–1.05)
EGFRCR SERPLBLD CKD-EPI 2021: 63 ML/MIN/1.73M*2
ERYTHROCYTE [DISTWIDTH] IN BLOOD BY AUTOMATED COUNT: 16.7 % (ref 11.5–14.5)
GLUCOSE SERPL-MCNC: 151 MG/DL (ref 74–99)
HCT VFR BLD AUTO: 29.3 % (ref 36–46)
HGB BLD-MCNC: 9 G/DL (ref 12–16)
MCH RBC QN AUTO: 27.4 PG (ref 26–34)
MCHC RBC AUTO-ENTMCNC: 30.7 G/DL (ref 32–36)
MCV RBC AUTO: 89 FL (ref 80–100)
NRBC BLD-RTO: 0 /100 WBCS (ref 0–0)
PLATELET # BLD AUTO: 490 X10*3/UL (ref 150–450)
POTASSIUM SERPL-SCNC: 4.9 MMOL/L (ref 3.5–5.3)
PROT SERPL-MCNC: 5.3 G/DL (ref 6.4–8.2)
RBC # BLD AUTO: 3.28 X10*6/UL (ref 4–5.2)
SODIUM SERPL-SCNC: 137 MMOL/L (ref 136–145)
WBC # BLD AUTO: 9.7 X10*3/UL (ref 4.4–11.3)

## 2024-08-02 PROCEDURE — 85027 COMPLETE CBC AUTOMATED: CPT | Mod: OUT | Performed by: INTERNAL MEDICINE

## 2024-08-02 PROCEDURE — 80053 COMPREHEN METABOLIC PANEL: CPT | Mod: OUT | Performed by: INTERNAL MEDICINE
